# Patient Record
Sex: FEMALE | Race: WHITE | NOT HISPANIC OR LATINO | Employment: FULL TIME | URBAN - METROPOLITAN AREA
[De-identification: names, ages, dates, MRNs, and addresses within clinical notes are randomized per-mention and may not be internally consistent; named-entity substitution may affect disease eponyms.]

---

## 2019-07-16 ENCOUNTER — APPOINTMENT (OUTPATIENT)
Dept: GENERAL RADIOLOGY | Facility: HOSPITAL | Age: 39
End: 2019-07-16

## 2019-07-16 PROCEDURE — 73610 X-RAY EXAM OF ANKLE: CPT | Performed by: EMERGENCY MEDICINE

## 2021-03-23 ENCOUNTER — APPOINTMENT (OUTPATIENT)
Dept: WOMENS IMAGING | Facility: HOSPITAL | Age: 41
End: 2021-03-23

## 2021-03-23 PROCEDURE — 77067 SCR MAMMO BI INCL CAD: CPT | Performed by: RADIOLOGY

## 2021-03-23 PROCEDURE — 77063 BREAST TOMOSYNTHESIS BI: CPT | Performed by: RADIOLOGY

## 2022-04-12 ENCOUNTER — APPOINTMENT (OUTPATIENT)
Dept: WOMENS IMAGING | Facility: HOSPITAL | Age: 42
End: 2022-04-12

## 2022-04-12 PROCEDURE — 77067 SCR MAMMO BI INCL CAD: CPT | Performed by: RADIOLOGY

## 2022-04-12 PROCEDURE — 77063 BREAST TOMOSYNTHESIS BI: CPT | Performed by: RADIOLOGY

## 2024-06-27 ENCOUNTER — OFFICE VISIT (OUTPATIENT)
Dept: FAMILY MEDICINE CLINIC | Facility: CLINIC | Age: 44
End: 2024-06-27
Payer: COMMERCIAL

## 2024-06-27 VITALS
DIASTOLIC BLOOD PRESSURE: 70 MMHG | SYSTOLIC BLOOD PRESSURE: 120 MMHG | OXYGEN SATURATION: 98 % | HEART RATE: 82 BPM | WEIGHT: 197.2 LBS | TEMPERATURE: 98 F | BODY MASS INDEX: 32.86 KG/M2 | HEIGHT: 65 IN

## 2024-06-27 DIAGNOSIS — F41.9 ANXIETY: Primary | ICD-10-CM

## 2024-06-27 DIAGNOSIS — K21.9 GASTROESOPHAGEAL REFLUX DISEASE, UNSPECIFIED WHETHER ESOPHAGITIS PRESENT: ICD-10-CM

## 2024-06-27 PROCEDURE — 99204 OFFICE O/P NEW MOD 45 MIN: CPT | Performed by: NURSE PRACTITIONER

## 2024-06-27 RX ORDER — ESCITALOPRAM OXALATE 10 MG/1
10 TABLET ORAL DAILY
Qty: 90 TABLET | Refills: 1 | Status: SHIPPED | OUTPATIENT
Start: 2024-06-27

## 2024-06-27 RX ORDER — OMEPRAZOLE 20 MG/1
20 CAPSULE, DELAYED RELEASE ORAL DAILY
Qty: 90 CAPSULE | Refills: 1 | Status: SHIPPED | OUTPATIENT
Start: 2024-06-27

## 2024-06-27 NOTE — PROGRESS NOTES
Chief Complaint  Establish Care, Heartburn, and Anxiety    Subjective        HPI   Nicolette presents to [facility] as a 44 year old female to establish care to discuss ongoing anxiety/depression, GERD, obtain labs, refill medications.  Overall doing okay    Previous PCP did retire a couple years ago.  She has not had any labs or follow-up since that time      Has had a history of anxiety and depression.  Previously has tried BuSpar, hydroxyzine and Ativan.  She feels like the Ativan was helping but does not feel that the BuSpar or hydroxyzine has helped at all.  She was referred to psychiatry to continue counseling/manage medications when her previous provider retired.  She is agreeable to trying a daily medication to see if this better controls her symptoms.  She has some excessive worry, problems with sleeping,  unable to relax, and feeling down.  She is worried about weight gain.    She has had some reflux.  Has tried over-the-counter Tums no other treatment to date.  She does try to avoid triggers.    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  She does have chronic diarrhea/loose stools.  She has no fever or acute abdominal pain, N/V.  No current rectal bleeding      She is not fasting today and will return for labs    She denies any other significant personal or family medical history she does have an upcoming appointment with GYN and would like to have her hormones tested      The following portions of the patient's history were reviewed and updated as appropriate: allergies, current medications, past family history, past medical history, past social history, past surgical history, and problem list      Review of Systems   Constitutional:  Negative for chills, fatigue and fever.   Eyes:  Negative for visual disturbance.   Respiratory:  Negative for cough, shortness of breath and wheezing.    Cardiovascular:  Negative for chest pain and  "leg swelling.   Gastrointestinal:  Positive for diarrhea. Negative for abdominal pain, constipation, nausea, rectal pain and vomiting.   Endocrine: Negative for polydipsia, polyphagia and polyuria.   Neurological:  Negative for dizziness.   Psychiatric/Behavioral:  Positive for sleep disturbance. Negative for self-injury and suicidal ideas. The patient is nervous/anxious.         Objective   Vital Signs:   Vitals:    06/27/24 1334   BP: 120/70   Pulse: 82   Temp: 98 °F (36.7 °C)   TempSrc: Oral   SpO2: 98%   Weight: 89.4 kg (197 lb 3.2 oz)   Height: 165.1 cm (65\")   PainSc: 0-No pain            6/27/2024     1:30 PM   PHQ-2/PHQ-9 Depression Screening   Little Interest or Pleasure in Doing Things 0-->not at all   Feeling Down, Depressed or Hopeless 0-->not at all   PHQ-9: Brief Depression Severity Measure Score 0       BMI is >= 30 and <35. (Class 1 Obesity). The following options were offered after discussion;: weight loss educational material (shared in after visit summary)        Physical Exam  Vitals reviewed.   Constitutional:       General: She is not in acute distress.  Eyes:      Conjunctiva/sclera: Conjunctivae normal.   Neck:      Thyroid: No thyromegaly.      Vascular: No carotid bruit.   Cardiovascular:      Rate and Rhythm: Normal rate and regular rhythm.      Heart sounds: Normal heart sounds. No murmur heard.  Pulmonary:      Effort: Pulmonary effort is normal. No respiratory distress.      Breath sounds: Normal breath sounds. No stridor. No wheezing, rhonchi or rales.   Chest:      Chest wall: No tenderness.   Abdominal:      General: There is no distension.      Palpations: There is no mass.      Tenderness: There is no abdominal tenderness. There is no right CVA tenderness, left CVA tenderness, guarding or rebound.      Hernia: No hernia is present.   Lymphadenopathy:      Cervical: No cervical adenopathy.   Neurological:      Mental Status: She is alert and oriented to person, place, and time. "   Psychiatric:         Attention and Perception: Attention normal.         Mood and Affect: Mood normal.          Result Review :     The following data was reviewed by: ELENA Villarreal on 06/27/2024:      COMPREHENSIVE METABOLIC PANEL (06/24/2022 08:31) potassium 3.3 AST 48 (5-34)  Normal kidney function, glucose normal  CBC AND DIFFERENTIAL (06/24/2022 08:31) H&H 14.1/44.4  Post surgery      Assessment and Plan    Assessment & Plan  Anxiety  Trial Lexapro  Discussed medication side effects.  Report any increase first 2 weeks  Denies any SI or HI    We will consider adding Wellbutrin if tolerates this medication well to counteract any weight gain she is concerned about  Gastroesophageal reflux disease, unspecified whether esophagitis present  Trial omeprazole  Avoid triggers including caffeine, nicotine, alcohol, spicy foods, red sauce       Orders Placed This Encounter   Procedures    FSH & LH    Hemoglobin A1c    Lipid panel    T4, Free    TSH    Comprehensive metabolic panel    CBC and Differential     New Medications Ordered This Visit   Medications    omeprazole (priLOSEC) 20 MG capsule     Sig: Take 1 capsule by mouth Daily.     Dispense:  90 capsule     Refill:  1    escitalopram (Lexapro) 10 MG tablet     Sig: Take 1 tablet by mouth Daily.     Dispense:  90 tablet     Refill:  1          Follow Up   Return in about 6 weeks (around 8/8/2024) for follow up anxiety.  Patient was given instructions and counseling regarding her condition or for health maintenance advice. Please see specific information pulled into the AVS if appropriate.

## 2024-08-24 LAB
ALBUMIN SERPL-MCNC: 4 G/DL (ref 3.5–5.2)
ALBUMIN/GLOB SERPL: 2.2 G/DL
ALP SERPL-CCNC: 66 U/L (ref 39–117)
ALT SERPL-CCNC: 20 U/L (ref 1–33)
AST SERPL-CCNC: 16 U/L (ref 1–32)
BASOPHILS # BLD AUTO: 0.04 10*3/MM3 (ref 0–0.2)
BASOPHILS NFR BLD AUTO: 0.6 % (ref 0–1.5)
BILIRUB SERPL-MCNC: 0.7 MG/DL (ref 0–1.2)
BUN SERPL-MCNC: 9 MG/DL (ref 6–20)
BUN/CREAT SERPL: 14.3 (ref 7–25)
CALCIUM SERPL-MCNC: 8.4 MG/DL (ref 8.6–10.5)
CHLORIDE SERPL-SCNC: 104 MMOL/L (ref 98–107)
CHOLEST SERPL-MCNC: 148 MG/DL (ref 0–200)
CO2 SERPL-SCNC: 24.6 MMOL/L (ref 22–29)
CREAT SERPL-MCNC: 0.63 MG/DL (ref 0.57–1)
EGFRCR SERPLBLD CKD-EPI 2021: 112.3 ML/MIN/1.73
EOSINOPHIL # BLD AUTO: 0.08 10*3/MM3 (ref 0–0.4)
EOSINOPHIL NFR BLD AUTO: 1.2 % (ref 0.3–6.2)
ERYTHROCYTE [DISTWIDTH] IN BLOOD BY AUTOMATED COUNT: 12.8 % (ref 12.3–15.4)
FSH SERPL-ACNC: 1.7 MIU/ML
GLOBULIN SER CALC-MCNC: 1.8 GM/DL
GLUCOSE SERPL-MCNC: 93 MG/DL (ref 65–99)
HBA1C MFR BLD: 5.1 % (ref 4.8–5.6)
HCT VFR BLD AUTO: 42.9 % (ref 34–46.6)
HDLC SERPL-MCNC: 49 MG/DL (ref 40–60)
HGB BLD-MCNC: 14.2 G/DL (ref 12–15.9)
IMM GRANULOCYTES # BLD AUTO: 0.01 10*3/MM3 (ref 0–0.05)
IMM GRANULOCYTES NFR BLD AUTO: 0.2 % (ref 0–0.5)
LDLC SERPL CALC-MCNC: 88 MG/DL (ref 0–100)
LH SERPL-ACNC: 2.8 MIU/ML
LYMPHOCYTES # BLD AUTO: 2.15 10*3/MM3 (ref 0.7–3.1)
LYMPHOCYTES NFR BLD AUTO: 32.3 % (ref 19.6–45.3)
MCH RBC QN AUTO: 29.8 PG (ref 26.6–33)
MCHC RBC AUTO-ENTMCNC: 33.1 G/DL (ref 31.5–35.7)
MCV RBC AUTO: 89.9 FL (ref 79–97)
MONOCYTES # BLD AUTO: 0.67 10*3/MM3 (ref 0.1–0.9)
MONOCYTES NFR BLD AUTO: 10.1 % (ref 5–12)
NEUTROPHILS # BLD AUTO: 3.71 10*3/MM3 (ref 1.7–7)
NEUTROPHILS NFR BLD AUTO: 55.6 % (ref 42.7–76)
NRBC BLD AUTO-RTO: 0 /100 WBC (ref 0–0.2)
PLATELET # BLD AUTO: 289 10*3/MM3 (ref 140–450)
POTASSIUM SERPL-SCNC: 4.4 MMOL/L (ref 3.5–5.2)
PROT SERPL-MCNC: 5.8 G/DL (ref 6–8.5)
RBC # BLD AUTO: 4.77 10*6/MM3 (ref 3.77–5.28)
SODIUM SERPL-SCNC: 138 MMOL/L (ref 136–145)
T4 FREE SERPL-MCNC: 1.47 NG/DL (ref 0.92–1.68)
TRIGL SERPL-MCNC: 51 MG/DL (ref 0–150)
TSH SERPL DL<=0.005 MIU/L-ACNC: 1.77 UIU/ML (ref 0.27–4.2)
VLDLC SERPL CALC-MCNC: 11 MG/DL (ref 5–40)
WBC # BLD AUTO: 6.66 10*3/MM3 (ref 3.4–10.8)

## 2024-08-30 ENCOUNTER — OFFICE VISIT (OUTPATIENT)
Dept: FAMILY MEDICINE CLINIC | Facility: CLINIC | Age: 44
End: 2024-08-30
Payer: COMMERCIAL

## 2024-08-30 VITALS
TEMPERATURE: 98.6 F | HEIGHT: 65 IN | WEIGHT: 195.3 LBS | OXYGEN SATURATION: 99 % | DIASTOLIC BLOOD PRESSURE: 80 MMHG | BODY MASS INDEX: 32.54 KG/M2 | HEART RATE: 59 BPM | SYSTOLIC BLOOD PRESSURE: 120 MMHG

## 2024-08-30 DIAGNOSIS — K21.9 GASTROESOPHAGEAL REFLUX DISEASE, UNSPECIFIED WHETHER ESOPHAGITIS PRESENT: ICD-10-CM

## 2024-08-30 DIAGNOSIS — F41.9 ANXIETY AND DEPRESSION: Primary | ICD-10-CM

## 2024-08-30 DIAGNOSIS — F32.A ANXIETY AND DEPRESSION: Primary | ICD-10-CM

## 2024-08-30 PROCEDURE — 99214 OFFICE O/P EST MOD 30 MIN: CPT | Performed by: NURSE PRACTITIONER

## 2024-08-30 RX ORDER — BUPROPION HYDROCHLORIDE 150 MG/1
150 TABLET ORAL DAILY
Qty: 60 TABLET | Refills: 1 | Status: SHIPPED | OUTPATIENT
Start: 2024-08-30

## 2024-08-30 RX ORDER — HYDROXYZINE HYDROCHLORIDE 10 MG/1
10 TABLET, FILM COATED ORAL 3 TIMES DAILY PRN
COMMUNITY

## 2024-08-30 RX ORDER — BUSPIRONE HYDROCHLORIDE 10 MG/1
10 TABLET ORAL 3 TIMES DAILY
COMMUNITY

## 2024-08-30 NOTE — PROGRESS NOTES
"Chief Complaint  Follow-up, Anxiety, and review labs    Subjective        Follow-up  Conditions present:  Anxiety  Current symptoms include no chest pain, no dizziness, no nausea, no shortness of breath, no fatigue, no headaches, no wheezing, no abdominal pain and no cough.   Anxiety   Symptoms include nervous/anxious behavior.  Patient reports no chest pain, dizziness, nausea, shortness of breath or suicidal ideas.      Nicolette presents to [facility] as a 44-year-old female for follow-up on anxiety, to discuss medication options and to review/discuss labs obtained on last visit.  Overall doing okay    Anxiety and depression.  Previously has tried BuSpar, hydroxyzine and Ativan.  She feels like the Ativan was helping but does not feel that the BuSpar or hydroxyzine has helped much.   She was referred to psychiatry to continue counseling/manage medications when her previous provider retired.  She is agreeable to trying a daily medication to see if this better controls her symptoms.      Lexapro was prescribed on last visit-  she did try that medication for only a couple of days and self D/c'd related to nausea-  states she \"felt like she was pregnant\"  was not able to tolerate    She has some excessive worry, problems with sleeping,  unable to relax, and feeling down.  She is worried about weight gain.      She is agreeable to trying Wellbutrin today.  She did take the Buspar and hydroxyzine again  a few times since last visit and does  feel they have helped slightly.       She has had some reflux.  Has tried over-the-counter Tums no other treatment to date.  She does try to avoid triggers.       She did have labs completed on 08/23/2024-  they were reviewed and discussed in office today    She has no other acute C/o      The following portions of the patient's history were reviewed and updated as appropriate: allergies, current medications, past family history, past medical history, " "past social history, past surgical history, and problem list      Review of Systems   Constitutional:  Negative for chills, fatigue and fever.   Eyes:  Negative for visual disturbance.   Respiratory:  Negative for cough, shortness of breath and wheezing.    Cardiovascular:  Negative for chest pain and leg swelling.   Gastrointestinal:  Negative for abdominal pain, diarrhea, nausea and vomiting.   Neurological:  Negative for dizziness.   Psychiatric/Behavioral:  Negative for self-injury, sleep disturbance and suicidal ideas. The patient is nervous/anxious.         Objective   Vital Signs:   Vitals:    08/30/24 0918   BP: 120/80   Pulse: 59   Temp: 98.6 °F (37 °C)   SpO2: 99%   Weight: 88.6 kg (195 lb 4.8 oz)   Height: 165.1 cm (65\")   PainSc: 0-No pain            6/27/2024     1:30 PM   PHQ-2/PHQ-9 Depression Screening   Little Interest or Pleasure in Doing Things 0-->not at all   Feeling Down, Depressed or Hopeless 0-->not at all   PHQ-9: Brief Depression Severity Measure Score 0                 Physical Exam  Vitals reviewed.   Constitutional:       General: She is not in acute distress.  Eyes:      Conjunctiva/sclera: Conjunctivae normal.   Neck:      Thyroid: No thyromegaly.      Vascular: No carotid bruit.   Cardiovascular:      Rate and Rhythm: Normal rate and regular rhythm.      Heart sounds: Normal heart sounds. No murmur heard.  Pulmonary:      Effort: Pulmonary effort is normal. No respiratory distress.      Breath sounds: Normal breath sounds. No stridor. No wheezing, rhonchi or rales.   Chest:      Chest wall: No tenderness.   Lymphadenopathy:      Cervical: No cervical adenopathy.   Neurological:      Mental Status: She is alert and oriented to person, place, and time.   Psychiatric:         Attention and Perception: Attention normal.         Mood and Affect: Mood normal.          Result Review :     The following data was reviewed by: ELENA Villarreal on 08/30/2024:  CBC and Differential " (08/23/2024 09:14)  Comprehensive metabolic panel (08/23/2024 09:14)  TSH (08/23/2024 09:14)  T4, Free (08/23/2024 09:14)  Lipid panel (08/23/2024 09:14)  Hemoglobin A1c (08/23/2024 09:14)  FSH & LH (08/23/2024 09:14)    Your blood sugar is normal no concerns for diabetes, your thyroid is normal, cholesterol looks great, you are not anemic, liver and kidney function look good  Your hormone levels do not show any postmenopausal signs at this time.     Overall I am very happy with your labs     Assessment and Plan    Assessment & Plan  Anxiety and depression  Lexapro self D/d- related to nausea    Trial Wellbutrin   Report any medication side effects immediately  To ER with any SI/HI        May continue Buspar and hydroxyzine as needed  Gastroesophageal reflux disease, unspecified whether esophagitis present  Avoid triggers including caffeine, nicotine, alcohol, spicy foods, red sauce        New Medications Ordered This Visit   Medications    buPROPion XL (Wellbutrin XL) 150 MG 24 hr tablet     Sig: Take 1 tablet by mouth Daily.     Dispense:  60 tablet     Refill:  1          Follow Up   Return in about 4 weeks (around 9/27/2024), or if symptoms worsen or fail to improve, for follow up anxiety.  Patient was given instructions and counseling regarding her condition or for health maintenance advice. Please see specific information pulled into the AVS if appropriate.

## 2024-10-15 ENCOUNTER — OFFICE VISIT (OUTPATIENT)
Dept: FAMILY MEDICINE CLINIC | Facility: CLINIC | Age: 44
End: 2024-10-15
Payer: COMMERCIAL

## 2024-10-15 VITALS
OXYGEN SATURATION: 99 % | HEART RATE: 79 BPM | TEMPERATURE: 96.5 F | BODY MASS INDEX: 34.45 KG/M2 | SYSTOLIC BLOOD PRESSURE: 120 MMHG | DIASTOLIC BLOOD PRESSURE: 74 MMHG | WEIGHT: 206.8 LBS | HEIGHT: 65 IN

## 2024-10-15 DIAGNOSIS — F41.9 ANXIETY AND DEPRESSION: Primary | ICD-10-CM

## 2024-10-15 DIAGNOSIS — F32.A ANXIETY AND DEPRESSION: Primary | ICD-10-CM

## 2024-10-15 DIAGNOSIS — K21.9 GASTROESOPHAGEAL REFLUX DISEASE, UNSPECIFIED WHETHER ESOPHAGITIS PRESENT: ICD-10-CM

## 2024-10-15 PROCEDURE — 99214 OFFICE O/P EST MOD 30 MIN: CPT | Performed by: NURSE PRACTITIONER

## 2024-10-15 RX ORDER — BUPROPION HYDROCHLORIDE 300 MG/1
300 TABLET ORAL DAILY
Qty: 90 TABLET | Refills: 2 | Status: SHIPPED | OUTPATIENT
Start: 2024-10-15

## 2024-10-15 RX ORDER — BUSPIRONE HYDROCHLORIDE 10 MG/1
10 TABLET ORAL 3 TIMES DAILY
Qty: 90 TABLET | Refills: 1 | Status: SHIPPED | OUTPATIENT
Start: 2024-10-15

## 2024-10-15 NOTE — PROGRESS NOTES
"Chief Complaint  Anxiety    Subjective        HPI   Nicolette presents to [facility]   as a 44-year-old female for follow-up on anxiety, to discuss medication options and to review/discuss labs obtained on last visit.  Overall doing okay     Anxiety and depression.  Previously has tried BuSpar, hydroxyzine and Ativan, Wellbutrin xl added on last visit-  She feels like the Ativan was helping but does not feel that the BuSpar or hydroxyzine has helped much.    Lexapro -  she did try that medication for only a couple of days and self D/c'd related to nausea-  states she \"felt like she was pregnant\"  was not able to tolerate    Wellbutrin has helped slightly, but still having symptoms   She was referred to psychiatry to continue counseling/manage medications when her previous provider retired.  She would like to increase wellbutrin  to see if this better controls her symptoms.            She has some excessive worry, problems with sleeping,  unable to relax, and feeling down.  She is worried about weight gain.     \  She did take the Buspar and hydroxyzine again  a few times since last visit and does  feel they have helped slightly.       She has had some reflux.  Has tried over-the-counter Tums no other treatment to date.  She does try to avoid triggers.        She did have labs completed on 08/23/2024-  they were reviewed and discussed in office today     She has no other acute C/o    The following portions of the patient's history were reviewed and updated as appropriate: allergies, current medications, past family history, past medical history, past social history, past surgical history, and problem list      Review of Systems   Constitutional:  Negative for chills, fatigue and fever.   Eyes:  Negative for visual disturbance.   Respiratory:  Negative for cough, shortness of breath, wheezing and stridor.    Cardiovascular:  Negative for chest pain, palpitations and leg swelling. " "  Gastrointestinal:  Negative for abdominal pain, diarrhea, nausea and vomiting.   Neurological:  Negative for dizziness.   Psychiatric/Behavioral:  Positive for dysphoric mood. Negative for self-injury, sleep disturbance and suicidal ideas. The patient is nervous/anxious.         Objective   Vital Signs:   Vitals:    10/15/24 1042   BP: 120/74   Pulse: 79   Temp: 96.5 °F (35.8 °C)   SpO2: 99%   Weight: 93.8 kg (206 lb 12.8 oz)   Height: 165.1 cm (65\")   PainSc: 0-No pain            6/27/2024     1:30 PM   PHQ-2/PHQ-9 Depression Screening   Retired Little Interest or Pleasure in Doing Things 0-->not at all   Retired Feeling Down, Depressed or Hopeless 0-->not at all   Retired PHQ-9: Brief Depression Severity Measure Score 0                 Physical Exam  Vitals reviewed.   Constitutional:       General: She is not in acute distress.  Eyes:      Conjunctiva/sclera: Conjunctivae normal.   Neck:      Thyroid: No thyromegaly.      Vascular: No carotid bruit.   Cardiovascular:      Rate and Rhythm: Normal rate and regular rhythm.      Heart sounds: Normal heart sounds. No murmur heard.  Pulmonary:      Effort: Pulmonary effort is normal. No respiratory distress.      Breath sounds: Normal breath sounds. No stridor. No wheezing, rhonchi or rales.   Chest:      Chest wall: No tenderness.   Lymphadenopathy:      Cervical: No cervical adenopathy.   Neurological:      Mental Status: She is alert and oriented to person, place, and time.   Psychiatric:         Attention and Perception: Attention normal.         Mood and Affect: Mood normal.         Behavior: Behavior normal.         Thought Content: Thought content normal.         Judgment: Judgment normal.          Result Review :     The following data was reviewed by: ELENA Villarreal on 10/15/2024:      CBC and Differential (08/23/2024 09:14)  Comprehensive metabolic panel (08/23/2024 09:14)  TSH (08/23/2024 09:14)  T4, Free (08/23/2024 09:14)  Lipid panel (08/23/2024 " 09:14)  Hemoglobin A1c (08/23/2024 09:14)  FSH & LH (08/23/2024 09:14)  Your lab results are back     Your blood sugar is normal no concerns for diabetes, your thyroid is normal, cholesterol looks great, you are not anemic, liver and kidney function look good  Your hormone levels do not show any postmenopausal signs at this time.     Overall I am very happy with your labs  Let me know if you have any questions         Assessment and Plan    Assessment & Plan  Anxiety and depression  Patient's depression is a recurrent episode that is severe without psychosis. Depression is active and stable.    Plan:   Continue current medication therapy   Increase wellbutrin xl, continue buspar    Followup in 4 weeks.          Gastroesophageal reflux disease, unspecified whether esophagitis present  Avoid triggers including caffeine, nicotine, alcohol, spicy foods, red sauce                Follow Up   Return in about 6 weeks (around 11/26/2024) for follow up anxiety.  Patient was given instructions and counseling regarding her condition or for health maintenance advice. Please see specific information pulled into the AVS if appropriate.

## 2024-10-29 ENCOUNTER — TELEPHONE (OUTPATIENT)
Dept: FAMILY MEDICINE CLINIC | Facility: CLINIC | Age: 44
End: 2024-10-29
Payer: COMMERCIAL

## 2024-10-31 ENCOUNTER — TELEPHONE (OUTPATIENT)
Dept: FAMILY MEDICINE CLINIC | Facility: CLINIC | Age: 44
End: 2024-10-31
Payer: COMMERCIAL

## 2024-10-31 NOTE — TELEPHONE ENCOUNTER
Department of Anesthesiology  Postprocedure Note    Patient: Amanda Foster  MRN: [id number]  YOB: 1958  Date of evaluation: 9/12/2023      Procedure Summary     Date: 09/12/23 Room / Location: [address] [place] [address]    Anesthesia Start: 9532 Anesthesia Stop: 0915    Procedures:       LEFT ULNAR NERVE DECOMPRESSION AT ELBOW (Left: Arm Lower)      LEFT CARPAL TUNNEL RELEASE (Left: Wrist) Diagnosis:       Cubital tunnel syndrome on left      Left carpal tunnel syndrome      (Cubital tunnel syndrome on left [G56.22])      (Left carpal tunnel syndrome [G56.02])    Surgeons: Elaine Whalen MD Responsible Provider: Lee Ann Dyson DO    Anesthesia Type: general ASA Status: 2          Anesthesia Type: No value filed.     Kvng Phase I: Kvng Score: 8    Kvng Phase II:        Anesthesia Post Evaluation    Patient location during evaluation: PACU  Patient participation: complete - patient participated  Level of consciousness: awake and alert  Pain score: 0  Airway patency: patent  Nausea & Vomiting: no nausea and no vomiting  Complications: no  Cardiovascular status: blood pressure returned to baseline and hemodynamically stable  Respiratory status: acceptable and room air  Hydration status: euvolemic  Pain management: adequate Spoke with patient advising her to reach out to her insurance company to get a correct fax number so that we can fax over her medical records.

## 2024-10-31 NOTE — TELEPHONE ENCOUNTER
Provider: JACK DUARTE    Caller: MACK HENSON    Relationship to Patient: PATIENT        Phone Number: [phone]    Reason for Call: PATIENT IS CALLING TO CHECK THE STATUS OF RECORDS THAT HER LIFE INSURANCE HAS REQUESTED.  SHE STATES IT IS OF URGENT NATURE DUE TO HER PAYING DOUBLE PREMIUMS RIGHT NOW.    PLEASE ADVISE.

## 2024-12-18 NOTE — TELEPHONE ENCOUNTER
Rx Refill Note  Requested Prescriptions     Pending Prescriptions Disp Refills    omeprazole (priLOSEC) 20 MG capsule [Pharmacy Med Name: OMEPRAZOLE 20MG CAPSULES] 90 capsule 1     Sig: TAKE 1 CAPSULE BY MOUTH DAILY      Last office visit with prescribing clinician: 10/15/2024   Last telemedicine visit with prescribing clinician: Visit date not found   Next office visit with prescribing clinician: Visit date not found                         Would you like a call back once the refill request has been completed: [] Yes [] No    If the office needs to give you a call back, can they leave a voicemail: [] Yes [] No    Ravi Arriola MA  12/18/24, 09:10 EST

## 2025-03-18 RX ORDER — OMEPRAZOLE 20 MG/1
20 CAPSULE, DELAYED RELEASE ORAL DAILY
Qty: 90 CAPSULE | Refills: 1 | OUTPATIENT
Start: 2025-03-18